# Patient Record
Sex: MALE | Race: WHITE | Employment: FULL TIME | URBAN - METROPOLITAN AREA
[De-identification: names, ages, dates, MRNs, and addresses within clinical notes are randomized per-mention and may not be internally consistent; named-entity substitution may affect disease eponyms.]

---

## 2020-10-06 NOTE — PROGRESS NOTES
Neurology Note    Patient ID:  Isabelle Cote  [id number]  14 y.o.  1960      Date of Consultation:  October 7, 2020    Referring Physician: Dr. Behzad Monae    Reason for Consultation:  Back pain. Subjective: I have pain in my back and hips at times. History of Present Illness:   Isabelle Cote is a 63 y.o. male who presents to the neurology clinic at [facility] for an evaluation. The patient states he has had chronic lower back pain for many years. He has had intermittent pain in the upper part of his back. He has noticed this more later in life. These are episodic in nature and occur more after he has done some physical activity. He will notice the symptoms centrally located in his back and will occasionally have radiation around his hips to his iliac crest.  He does not notice any weakness associated with it. The symptoms can last anywhere from a day to a week to 10 days. When he was doing Pilates a few months ago he noticed that his symptoms were much less but he has not been performing this as much recently because he did hurt his shoulder. He has been seeing orthopedic surgeons over the time. He was seeing Dr. [place] and Dr. Natividad Marte. He has done physical therapy in the past.  He states he did have a neck injury once about 18 years ago while sled riding. As noted above, he has had issues with his shoulders. He has found that role on therapy is also beneficial for him. He did have some irritation of his ulnar nerve in the past but that also has been resolved. He states that occasionally he will get sensory disturbance in his distal toes on the right but he associates this to a frostbite in the past.      He states that he can golf, but can be sore afterwards. History reviewed. No pertinent past medical history.    Right knee injury      Past Surgical History:   Procedure Laterality Date    HX KNEE REPLACEMENT      right  HX SHOULDER ARTHROSCOPY          Family History   Problem Relation Age of Onset   23 [facility] Stroke Mother     Stroke Father         Social History     Tobacco Use    Smoking status: Never Smoker    Smokeless tobacco: Never Used   Substance Use Topics    Alcohol use: Not on file        Not on File     Prior to Admission medications    Medication Sig Start Date End Date Taking? Authorizing Provider   ibuprofen (AdviL) 100 mg tablet Take 100 mg by mouth every six (6) hours as needed for Pain. Yes Provider, Historical       Review of Systems:    General, constitutional: negative  Eyes, vision: negative  Ears, nose, throat: hearing loss  Cardiovascular, heart: negative  Respiratory: negative  Gastrointestinal: diarrhea  Genitourinary: negative  Musculoskeletal: joint pain. Muscle pain  Skin and integumentary: negative  Psychiatric: negative  Endocrine: negative  Neurological: negative, except for HPI  Hematologic/lymphatic: negative  Allergy/immunology: negative    Objective:     Visit Vitals  /71 (BP 1 Location: Left arm, BP Patient Position: Sitting)   Pulse 80   Temp 97.7 °F (36.5 °C) (Oral)   Resp 18   Ht 6' (1.829 m)   Wt 162 lb (73.5 kg)   SpO2 98%   BMI 21.97 kg/m²       Physical Exam:      General:  appears well nourished in no acute distress  Neck: no carotid bruits  Lungs: clear to auscultation  Heart:  no murmurs, regular rate  Lower extremity: peripheral pulses palpable and no edema  Skin: intact    Neurological exam:    Awake, alert, oriented to person, place and time  Recent and remote memory were normal  Attention and concentration were intact  Language was intact.   There was no aphasia  Speech: no dysarthria  Fund of knowledge was preserved    Cranial nerves:   II-XII were tested    Perrrla  Fundoscopic examination revealed venous pulsations and no clear abnormalities  Visual fields were full  Eomi, no evidence of nystagmus  Facial sensation:  normal and symmetric  Facial motor: normal and symmetric  Hearing intact  SCM strength intact  Tongue: midline without fasciculations    Motor: Tone normal    No evidence of fasciculations    Strength testing:   deltoid triceps biceps Wrist ext. Wrist flex. intrinsics Hip flex. Hip ext. Knee ext. Knee flex Dorsi flex Plantar flex   Right 5 5 5 5 5 5 5 5 5 5 5 5   Left 5 5 5 5 5 5 5 5 5 5 5 5     Straight leg raising is negative. No pain with lumbar flexion or extension. Full range of motion in hip. Sensory:  Upper extremity: intact to pp, light touch, and vibration > 10 seconds  Lower extremity: intact to pp, light touch, and vibration > 10 seconds    Reflexes:    Right Left  Biceps  2 2  Triceps 2 2  Brachiorad. 2 2  Patella  2 2  Achilles 2 2    Plantar response:  flexor bilaterally      Cerebellar testing:  no tremor apparent, finger/nose and jeremy were intact    Romberg: absent    Gait: steady. Heel, toe, and tandem gait were normal    Labs:     No results found for: HBA1C, NA, K, CL, GLU, BUN, CREA, CA, WBC, HCT, HGB, PLT, LDL, ZWO5LRHR, HCTEXT, HGBEXT, PLTEXT, TZI3HDSM, HCTEXT, HGBEXT, PLTEXT    Imaging:    No results found for this or any previous visit. No results found for this or any previous visit. He did have an x-ray of his lumbar spine performed on March 4, 2020 which revealed mild degenerative changes    He did have an x-ray performed of his cervical spine which revealed mild facet arthropathy. Did have an MRI of his lumbar spine performed in 2018 which revealed mild degenerative disease and facet arthropathy. There was also mild neuroforaminal stenosis at L3-4 and 4 5. Assessment and Plan:    The patient is a pleasant 39-year-old gentleman who presents with intermittent pain in his back with radiation into his hip.   His neurological examination is normal.    Intermittent back pain with radiation into the hips:    He does have a normal neuromuscular exam.    This is most likely intermittent musculoskeletal inflammation causing radiating nerve pain. He was reassured to know that his neurological exam was normal.  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 I stressed to him the importance of core strengthening exercises daily as well as range of motion stretching activities. Restarting Pilates would be a good thing for him. We also did discuss the importance of safety with lifting of objects and moving in regards to preventing recurrence of symptoms. I did recommend that he continue with anti-inflammatory medicine such as Aleve as well as topical medications to help provide symptomatic relief. If symptoms do become persistent or constant, he will contact the office for immediate evaluation    I also did not see any evidence of a peripheral neuropathy upon today's examination, but this is something that can be closely monitored. The patient should return to clinic as needed. He will keep in touch via portal.     Renewed medication: none today    I spent  60   minutes with the patient  with over 50 % of the time counseling and coordinating the care plan in regards to the diagnosis, diagnostic testing, and treatment plan. The patient had the ability to ask questions and all questions were answered. There is no problem list on file for this patient.                   Signed By:  Vida Mejias DO FAAN    October 7, 2020

## 2020-10-07 ENCOUNTER — OFFICE VISIT (OUTPATIENT)
Dept: NEUROLOGY | Age: 60
End: 2020-10-07
Payer: COMMERCIAL

## 2020-10-07 VITALS
DIASTOLIC BLOOD PRESSURE: 71 MMHG | SYSTOLIC BLOOD PRESSURE: 100 MMHG | TEMPERATURE: 97.7 F | RESPIRATION RATE: 18 BRPM | OXYGEN SATURATION: 98 % | HEART RATE: 80 BPM | BODY MASS INDEX: 21.94 KG/M2 | HEIGHT: 72 IN | WEIGHT: 162 LBS

## 2020-10-07 DIAGNOSIS — R20.9 SENSORY DISTURBANCE: ICD-10-CM

## 2020-10-07 DIAGNOSIS — M79.18 MUSCLE PAIN, LUMBAR: Primary | ICD-10-CM

## 2020-10-07 DIAGNOSIS — M19.90 ARTHRITIS: ICD-10-CM

## 2020-10-07 PROCEDURE — 99215 OFFICE O/P EST HI 40 MIN: CPT | Performed by: PSYCHIATRY & NEUROLOGY

## 2024-06-06 ENCOUNTER — TELEPHONE (OUTPATIENT)
Age: 64
End: 2024-06-06